# Patient Record
Sex: FEMALE | Race: WHITE
[De-identification: names, ages, dates, MRNs, and addresses within clinical notes are randomized per-mention and may not be internally consistent; named-entity substitution may affect disease eponyms.]

---

## 2017-07-25 ENCOUNTER — HOSPITAL ENCOUNTER (EMERGENCY)
Dept: HOSPITAL 25 - UCCORT | Age: 37
Discharge: HOME | End: 2017-07-25
Payer: SELF-PAY

## 2017-07-25 VITALS — SYSTOLIC BLOOD PRESSURE: 138 MMHG | DIASTOLIC BLOOD PRESSURE: 57 MMHG

## 2017-07-25 DIAGNOSIS — Z88.1: ICD-10-CM

## 2017-07-25 DIAGNOSIS — F17.210: ICD-10-CM

## 2017-07-25 DIAGNOSIS — Z88.6: ICD-10-CM

## 2017-07-25 DIAGNOSIS — M62.830: ICD-10-CM

## 2017-07-25 DIAGNOSIS — M47.812: Primary | ICD-10-CM

## 2017-07-25 PROCEDURE — 96372 THER/PROPH/DIAG INJ SC/IM: CPT

## 2017-07-25 PROCEDURE — 99212 OFFICE O/P EST SF 10 MIN: CPT

## 2017-07-25 PROCEDURE — G0463 HOSPITAL OUTPT CLINIC VISIT: HCPCS

## 2017-07-25 NOTE — UC
Back Pain HPI





- HPI Summary


HPI Summary: 





Patient has chronic low back pain s/p lumbar fusion, she woke up 3 days ago, 

with acute spasm of the left shoulder and neck. HX of OA. nothing has improved 

it, gotten worse, pain is not tolerable anymore. denies any injury





- History of Current Complaint


Chief Complaint: UCBackPain


Stated Complaint: SHOULDER/NECK PAIN


Time Seen by Provider: 07/25/17 19:09


Hx Obtained From: Patient


Hx Last Menstrual Period: 07/04/17


Pregnant?: No


Onset/Duration: Sudden Onset, Lasting Days


Timing: Lasting Days


Severity Initially: Moderate


Severity Currently: Severe


Back Pain: Is Discrete @


Character: Dull, Throbbing, Spasmodic


Aggravating: Movement


Alleviating: Rest





- Allergies/Home Medications


Allergies/Adverse Reactions: 


 Allergies











Allergy/AdvReac Type Severity Reaction Status Date / Time


 


Naproxen Allergy Intermediate Hives Verified 07/25/17 19:19


 


Amoxicillin Allergy  Hives Verified 07/14/16 18:48


 


Clindamycin Allergy  Swelling Verified 07/25/17 19:18














PMH/Surg Hx/FS Hx/Imm Hx


Previously Healthy: Yes





- Surgical History


Surgical History: Yes


Surgery Procedure, Year, and Place: C-Sections: 2001, 2006, 2008 /BACK SURGERY-

L4[place] [facility] [place]





- Family History


Known Family History: 


   Negative: Cardiac Disease, Hypertension





- Social History


Alcohol Use: None


Substance Use Type: None


Smoking Status (MU): Heavy Every Day Tobacco Smoker


Type: Cigarettes


Amount Used/How Often: 1 PPD


Length of Time of Smoking/Using Tobacco: 15+ years


Have You Smoked in the Last Year: Yes





- Immunization History


Most Recent Influenza Vaccination: up to date


Most Recent Tetanus Shot: up to date


Most Recent Pneumonia Vaccination: never





Review of Systems


Constitutional: Negative


Skin: Negative


Eyes: Negative


ENT: Negative


Respiratory: Negative


Cardiovascular: Negative


Gastrointestinal: Negative


Genitourinary: Negative


Motor: Negative


Musculoskeletal: Arthralgia, Decreased ROM, Myalgia


Neurological: Negative


Psychological: Negative


All Other Systems Reviewed And Are Negative: Yes





Physical Exam


Triage Information Reviewed: Yes


Appearance: Well-Appearing, Well-Nourished, Pain Distress


Vital Signs: 


 Initial Vital Signs











Temp  98 F   07/25/17 19:06


 


Pulse  60   07/25/17 19:06


 


Resp  18   07/25/17 19:06


 


BP  138/57   07/25/17 19:06


 


Pulse Ox  97   07/25/17 19:06











Vital Signs Reviewed: Yes


Eye Exam: Normal


Eyes: Positive: Conjunctiva Clear


ENT Exam: Normal


ENT: Positive: Hearing grossly normal, Pharynx normal, TMs normal


Dental Exam: Normal


Neck exam: Normal


Neck: Positive: Supple, Nontender, No Lymphadenopathy


Respiratory Exam: Normal


Respiratory: Positive: Chest non-tender, Lungs clear, Normal breath sounds


Cardiovascular Exam: Normal


Cardiovascular: Positive: RRR, No Murmur, Pulses Normal


Abdominal Exam: Normal


Abdomen Description: Positive: Nontender, No Organomegaly, Soft


Bowel Sounds: Positive: Present


Musculoskeletal: Positive: ROM Limited @ - with abduction, Other: - spams of 

the trapezius and neck musculature, erector spinae


Neurological Exam: Normal


Neurological: Positive: Alert, Muscle Tone Normal


Psychological Exam: Normal


Skin Exam: Normal





Back Pain Course/Dx





- Course


Course Of Treatment: hx obtained, exam performed ,meds reviewed, toradol and 

flexeril given, patient is currently undergoing PT for her pain.





- Differential Dx/Diagnosis


Differential Diagnosis/HQI/PQRI: Arthritis, Herniated Disc, Strain, Sprain


Provider Diagnoses: OA of the neck.  Acture muscle spasm of upper back and neck





Discharge





- Discharge Plan


Condition: Stable


Disposition: HOME


Patient Education Materials:  Spasmodic Torticollis (ED)


Additional Instructions: 


1. take the medication as prescribed.


2. continue with PT.


3. Heat


4. Stretches and massage

## 2019-04-30 ENCOUNTER — HOSPITAL ENCOUNTER (EMERGENCY)
Dept: HOSPITAL 25 - UCCORT | Age: 39
Discharge: HOME | End: 2019-04-30
Payer: COMMERCIAL

## 2019-04-30 VITALS — DIASTOLIC BLOOD PRESSURE: 82 MMHG | SYSTOLIC BLOOD PRESSURE: 139 MMHG

## 2019-04-30 DIAGNOSIS — Z88.0: ICD-10-CM
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
## 2019-04-30 DIAGNOSIS — S82.832A: Primary | ICD-10-CM

## 2019-04-30 DIAGNOSIS — Z88.6: ICD-10-CM

## 2019-04-30 DIAGNOSIS — F17.210: ICD-10-CM
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
## 2019-04-30 PROCEDURE — G0463 HOSPITAL OUTPT CLINIC VISIT: HCPCS

## 2019-04-30 PROCEDURE — 99211 OFF/OP EST MAY X REQ PHY/QHP: CPT

## 2019-05-01 NOTE — UC
- Progress Note


Progress Note: 





Patients ankle and lower ext xray on left shows distal fibula fracture 


Patient needs to remain non-weight bearing with use of her crutches 


She needs to follow up with Orthopedics - Dr. Cunningham in [place] or [place] 

[facility] in [place] - [phone] 





Course/Dx





- Diagnoses


Provider Diagnoses: 


 Nondisplaced fracture, Closed left ankle fracture, Hypertension








Discharge





- Sign-Out/Discharge


Documenting (check all that apply): Post-Discharge Follow Up


All imaging exams completed and their final reports reviewed: Yes





- Discharge Plan


Condition: Good


Disposition: HOME


Patient Education Materials:  Ankle Fracture (ED), Hypertension (ED)


Referrals: 


Kailash Cunningham MD [Medical Doctor] - 1 Day


PINKY Cullen [Primary Care Provider] - 





- Billing Disposition and Condition


Condition: GOOD


Disposition: Home